# Patient Record
Sex: FEMALE | Race: BLACK OR AFRICAN AMERICAN
[De-identification: names, ages, dates, MRNs, and addresses within clinical notes are randomized per-mention and may not be internally consistent; named-entity substitution may affect disease eponyms.]

---

## 2020-11-16 ENCOUNTER — HOSPITAL ENCOUNTER (EMERGENCY)
Dept: HOSPITAL 26 - MED | Age: 20
Discharge: LEFT BEFORE DISCHARGE | End: 2020-11-16
Payer: MEDICAID

## 2020-11-16 DIAGNOSIS — Z53.21: Primary | ICD-10-CM

## 2020-11-16 NOTE — NUR
PATIENT LEFT WITHOUT BEING SEEN BY DR. FELTCHER WHILE WAITING FOR BEING 
TRIAGED. NO FURTHER CARE PROVIDED FOR PATIENT. DR. MOORE MADE AWARE.